# Patient Record
(demographics unavailable — no encounter records)

---

## 2024-11-26 NOTE — DISCUSSION/SUMMARY
[FreeTextEntry1] : 89 F HTN, GERD, pAF, SSS s/p PPM ([facility], 08/12), mod AS, here for follow up visit.  #H/o atypical chest pain Pharm stress test 2023 showed mild inferior, inferolateral reversible defect suggestive of ischemia -TTE 10/2024-no significant change from 2019/2023 -Off aspirin (stopped taking 6 mo ago given AC and advanced age) -C/w Metoprolol, will re-prescribe at 25 mg qd  -C/w atorvastatin 80 qd   #Subacute Fatigue, Dyspnea  As explained previously, this is less likely related to her moderate AS given no change from prior echo and no SOB or chest pain. Her device is likely set with managed ventricular pacing w/ AV hysteresis for intrinsic conduction allowing for a long AV delay given her baseline [place] prolongation. Suspect fatigue is likely deconditioning. No changes to symptoms recently. TTE 10/2024 with no interval changes - CTM   #pAfib -C/w apixaban 2.5 mg bid   #Mild-Moderate AS: as noted on prior TTE, also w/ mild-moderate AR. Stable since 2019, most recently 10/2024  -Repeat TTE as above  #HTN -C/w losartan 25 mg qd

## 2024-11-26 NOTE — HISTORY OF PRESENT ILLNESS
[FreeTextEntry1] : The patient is an [id number] with a history of hypertension, GERD, paroxysmal atrial fibrillation (pAF), sick sinus syndrome (SSS) status post dual-chamber pacemaker implantation in 2012 with generator change in 2021, and moderate aortic stenosis (AS), here for follow-up.   Pt accompanied by daughter. At her last visit in March 2024, she reported exertional fatigue and dyspnea without chest pain, palpitations, lightheadedness, or syncope. Device interrogation demonstrated normal function with adequate sensing and pacing thresholds, and the plan included maintaining medical therapy and repeating imaging to assess for any interval structural changes, as her fatigue was suspected to be multifactorial, including potential deconditioning.  She states her mother's primary issue has been gastritis symptoms and HA, requesting refill of Famotidine. Also has been off metoprolol for 'a while' but did not disclose this on previous visit. Additionally not taking statin. Denies acute cardiopulmonary complaints.   Recent transthoracic echocardiogram (TTE) from October 30, 2024, showed stable findings compared to prior imaging, including an ejection fraction of 61%, moderate (grade 2) diastolic dysfunction, severe left atrial dilation, mild aortic stenosis, moderate aortic regurgitation, moderate tricuspid regurgitation, and borderline pulmonary hypertension. Right ventricular function was normal, and there was no evidence of pericardial effusion or interval structural changes. Recent device interrogation remains consistent with normal function and stable thresholds.

## 2024-11-26 NOTE — PHYSICAL EXAM
[No Acute Distress] : no acute distress [Normal S1, S2] : normal S1, S2 [Clear Lung Fields] : clear lung fields [de-identified] : systolic murmur

## 2024-11-26 NOTE — CARDIOLOGY SUMMARY
[de-identified] : 11/26/24: sinus with AP 72 8/20/2024: sinus with AP 66 [de-identified] : Pharm NST 6/2023: small mild reversible defect in distal inferior and inferolateral wall c/w ischemia. stress LVEFG 63%, EDV 52, ESV 19  [de-identified] : TTE Summary (10/30/2024): LVEF 61%, GLS -18.6% (WNL). Mod (grade 2) diastolic dysfx w/  LV filling pressures. Severe LA dilation (KIERRA 50.17 mL/m). AV: mild AS (Vmax 2.48 m/s, MG 14 mmHg, SHIVANI 1.28 cm) + mod AR ( ms). TV: mod TR, PASP 39 mmHg (borderline pHTN). Normal RV fx (TAPSE 1.8 cm, S' 10 cm/s). No RWMAs or pericardial effusion. Findings stable vs. 9/2023. Normal LV mass/geometry.  TTE 9/2023: LVEF 66%, grade 2 DD, RV normal, moderate AS (peak caro 1.95 m/s, PG 15 mmHg, MG 8 mmHg, DVI 0.5, SHIVANI 1.28), mild to mod AR, no changes compared to 11/2019 [de-identified] : Device Interrogation Summary (9/30/2024): Pacemaker: Medtronic [id number] [place] XT DR ROMEO, implanted 3/12/2021, 42 months old. Battery voltage 3.01 V (RRT = 2.63 V) with ~9.5 yrs of longevity remaining. RA lead (Guidant 4469, implanted 8/14/2012): impedance 418 , pacing threshold 1.0 V @ 0.4 ms. RV lead (Medtronic 4074, implanted 8/14/2012): impedance 456 , pacing threshold 1.0 V @ 0.4 ms, R-wave amplitude 14.8 mV. Mode: AAIRDDD.  <0.1%. Histograms show HR variability WNL. Lead safety margins adjusted (RA/RV from 2X to 1.5X) to optimize battery life. AP: 100%. No recent arrhythmias/events recorded.  6/2024 Remote ICD: battery life 9.7 years. Presenting AP/VS. AT/AF w CVR noted 6/7 ~46 seconds for review.

## 2025-03-25 NOTE — END OF VISIT
[] : Resident [FreeTextEntry3] : as per above note, pt reports no mechanical falls/no concerns re balance. has osteporosis and on chronic AC for a fib cont to monitor her ADLs capabilities and maintaining safe envrionment

## 2025-03-25 NOTE — REVIEW OF SYSTEMS
[Shortness Of Breath] : shortness of breath [Muscle Pain] : muscle pain [Anxiety] : anxiety [Negative] : Neurological [FreeTextEntry6] : Goes away when she smells alcohol [FreeTextEntry7] : Only with spicy food [de-identified] : social anxiety

## 2025-03-25 NOTE — REVIEW OF SYSTEMS
[Shortness Of Breath] : shortness of breath [Muscle Pain] : muscle pain [Anxiety] : anxiety [Negative] : Neurological [FreeTextEntry6] : Goes away when she smells alcohol [FreeTextEntry7] : Only with spicy food [de-identified] : social anxiety

## 2025-03-25 NOTE — REVIEW OF SYSTEMS
[Shortness Of Breath] : shortness of breath [Muscle Pain] : muscle pain [Anxiety] : anxiety [Negative] : Neurological [FreeTextEntry6] : Goes away when she smells alcohol [de-identified] : social anxiety [FreeTextEntry7] : Only with spicy food

## 2025-03-25 NOTE — HISTORY OF PRESENT ILLNESS
[FreeTextEntry1] : CPE [de-identified] : 90 y/o F with PMH of HTN, p-Afib, osteoporosis, GERD, diffuse bone pain, h/o LFTs here for CPE. Patient has complaints of random muscle aches but other than that feels ok. She is AO X 2.5 (name/location/year and day and month intact but doesn't know date). She is able to complete all of her ADLs herself. She lives with her daughter. Discussed MOLST form with the family and patient wasn't sure. She doesn't want pain of procedures but also wants to live as long as possible.

## 2025-03-25 NOTE — HISTORY OF PRESENT ILLNESS
[FreeTextEntry1] : CPE [de-identified] : 88 y/o F with PMH of HTN, p-Afib, osteoporosis, GERD, diffuse bone pain, h/o LFTs here for CPE. Patient has complaints of random muscle aches but other than that feels ok. She is AO X 2.5 (name/location/year and day and month intact but doesn't know date). She is able to complete all of her ADLs herself. She lives with her daughter. Discussed MOLST form with the family and patient wasn't sure. She doesn't want pain of procedures but also wants to live as long as possible.

## 2025-03-25 NOTE — HEALTH RISK ASSESSMENT
[Good] : ~his/her~  mood as  good [No] : No [No falls in past year] : Patient reported no falls in the past year [0] : 2) Feeling down, depressed, or hopeless: Not at all (0) [Never] : Never [None] : None [With Family] : lives with family [Unemployed] : unemployed [Single] : single [Feels Safe at Home] : Feels safe at home [Fully functional (bathing, dressing, toileting, transferring, walking, feeding)] : Fully functional (bathing, dressing, toileting, transferring, walking, feeding) [Fully functional (using the telephone, shopping, preparing meals, housekeeping, doing laundry, using] : Fully functional and needs no help or supervision to perform IADLs (using the telephone, shopping, preparing meals, housekeeping, doing laundry, using transportation, managing medications and managing finances) [Smoke Detector] : smoke detector [Carbon Monoxide Detector] : carbon monoxide detector [Safety elements used in home] : safety elements used in home [Seat Belt] :  uses seat belt [With Patient/Caregiver] : , with patient/caregiver [Designated Healthcare Proxy] : Designated healthcare proxy [Name: ___] : Health Care Proxy's Name: [unfilled]  [Relationship: ___] : Relationship: [unfilled] [I will adhere to the patient's wishes.] : I will adhere to the patient's wishes. [de-identified] : None [Change in mental status noted] : No change in mental status noted [Language] : denies difficulty with language [Behavior] : denies difficulty with behavior [Learning/Retaining New Information] : denies difficulty learning/retaining new information [Handling Complex Tasks] : denies difficulty handling complex tasks [Reasoning] : denies difficulty with reasoning [Spatial Ability and Orientation] : denies difficulty with spatial ability and orientation [Reports changes in hearing] : Reports no changes in hearing [Reports changes in vision] : Reports no changes in vision [Reports normal functional visual acuity (ie: able to read med bottle)] : Reports poor functional visual acuity.  [Reports changes in dental health] : Reports no changes in dental health [AdvancecareDate] : 03/18/25 [FreeTextEntry4] : Discussed CPR/intubation with patient. Not sure about decision. Will take MOLST home to review with family.

## 2025-03-25 NOTE — PHYSICAL EXAM
[Normal] : soft, non-tender, non-distended, no masses palpated, no HSM and normal bowel sounds [de-identified] : Ear wax, was only able to examine part of her TM

## 2025-03-25 NOTE — PHYSICAL EXAM
[Normal] : soft, non-tender, non-distended, no masses palpated, no HSM and normal bowel sounds [de-identified] : Ear wax, was only able to examine part of her TM

## 2025-03-25 NOTE — HEALTH RISK ASSESSMENT
[Good] : ~his/her~  mood as  good [No] : No [No falls in past year] : Patient reported no falls in the past year [0] : 2) Feeling down, depressed, or hopeless: Not at all (0) [Never] : Never [None] : None [With Family] : lives with family [Unemployed] : unemployed [Single] : single [Feels Safe at Home] : Feels safe at home [Fully functional (bathing, dressing, toileting, transferring, walking, feeding)] : Fully functional (bathing, dressing, toileting, transferring, walking, feeding) [Fully functional (using the telephone, shopping, preparing meals, housekeeping, doing laundry, using] : Fully functional and needs no help or supervision to perform IADLs (using the telephone, shopping, preparing meals, housekeeping, doing laundry, using transportation, managing medications and managing finances) [Smoke Detector] : smoke detector [Carbon Monoxide Detector] : carbon monoxide detector [Safety elements used in home] : safety elements used in home [Seat Belt] :  uses seat belt [With Patient/Caregiver] : , with patient/caregiver [Designated Healthcare Proxy] : Designated healthcare proxy [Name: ___] : Health Care Proxy's Name: [unfilled]  [Relationship: ___] : Relationship: [unfilled] [I will adhere to the patient's wishes.] : I will adhere to the patient's wishes. [de-identified] : None [Change in mental status noted] : No change in mental status noted [Language] : denies difficulty with language [Behavior] : denies difficulty with behavior [Learning/Retaining New Information] : denies difficulty learning/retaining new information [Handling Complex Tasks] : denies difficulty handling complex tasks [Reasoning] : denies difficulty with reasoning [Spatial Ability and Orientation] : denies difficulty with spatial ability and orientation [Reports changes in hearing] : Reports no changes in hearing [Reports changes in vision] : Reports no changes in vision [Reports normal functional visual acuity (ie: able to read med bottle)] : Reports poor functional visual acuity.  [Reports changes in dental health] : Reports no changes in dental health [AdvancecareDate] : 03/18/25 [FreeTextEntry4] : Discussed CPR/intubation with patient. Not sure about decision. Will take MOLST home to review with family.

## 2025-03-25 NOTE — PHYSICAL EXAM
[Normal] : soft, non-tender, non-distended, no masses palpated, no HSM and normal bowel sounds [de-identified] : Ear wax, was only able to examine part of her TM

## 2025-03-25 NOTE — HISTORY OF PRESENT ILLNESS
[FreeTextEntry1] : CPE [de-identified] : 88 y/o F with PMH of HTN, p-Afib, osteoporosis, GERD, diffuse bone pain, h/o LFTs here for CPE. Patient has complaints of random muscle aches but other than that feels ok. She is AO X 2.5 (name/location/year and day and month intact but doesn't know date). She is able to complete all of her ADLs herself. She lives with her daughter. Discussed MOLST form with the family and patient wasn't sure. She doesn't want pain of procedures but also wants to live as long as possible.

## 2025-03-25 NOTE — PLAN
[FreeTextEntry1] : #SNHL #Impacted Cerumen -ENT referral  #Osteoporosis -Repeat dexa -Referral for endo follow-up   #HCM -f/u CMP/CBC

## 2025-03-25 NOTE — HEALTH RISK ASSESSMENT
[Good] : ~his/her~  mood as  good [No] : No [No falls in past year] : Patient reported no falls in the past year [0] : 2) Feeling down, depressed, or hopeless: Not at all (0) [Never] : Never [None] : None [With Family] : lives with family [Unemployed] : unemployed [Single] : single [Feels Safe at Home] : Feels safe at home [Fully functional (bathing, dressing, toileting, transferring, walking, feeding)] : Fully functional (bathing, dressing, toileting, transferring, walking, feeding) [Fully functional (using the telephone, shopping, preparing meals, housekeeping, doing laundry, using] : Fully functional and needs no help or supervision to perform IADLs (using the telephone, shopping, preparing meals, housekeeping, doing laundry, using transportation, managing medications and managing finances) [Smoke Detector] : smoke detector [Carbon Monoxide Detector] : carbon monoxide detector [Safety elements used in home] : safety elements used in home [Seat Belt] :  uses seat belt [With Patient/Caregiver] : , with patient/caregiver [Designated Healthcare Proxy] : Designated healthcare proxy [Name: ___] : Health Care Proxy's Name: [unfilled]  [Relationship: ___] : Relationship: [unfilled] [I will adhere to the patient's wishes.] : I will adhere to the patient's wishes. [de-identified] : None [Change in mental status noted] : No change in mental status noted [Language] : denies difficulty with language [Behavior] : denies difficulty with behavior [Learning/Retaining New Information] : denies difficulty learning/retaining new information [Handling Complex Tasks] : denies difficulty handling complex tasks [Reasoning] : denies difficulty with reasoning [Spatial Ability and Orientation] : denies difficulty with spatial ability and orientation [Reports changes in hearing] : Reports no changes in hearing [Reports changes in vision] : Reports no changes in vision [Reports normal functional visual acuity (ie: able to read med bottle)] : Reports poor functional visual acuity.  [Reports changes in dental health] : Reports no changes in dental health [AdvancecareDate] : 03/18/25 [FreeTextEntry4] : Discussed CPR/intubation with patient. Not sure about decision. Will take MOLST home to review with family.

## 2025-03-28 NOTE — HISTORY OF PRESENT ILLNESS
[FreeTextEntry1] : The patient is an [id number] with a history of hypertension, GERD, paroxysmal atrial fibrillation (pAF), sick sinus syndrome (SSS) status post dual-chamber pacemaker implantation in 2012 with generator change in 2021, and moderate aortic stenosis (AS), here for follow-up.   Pt accompanied by daughter. She states her mother's primary issue has been muskuloskeletal (R shoulder abduction and back pain). BP in office today is elevated, on re-check 150/70. She has been on only 12.5 mg of Losartan. Now adherent to metoprolol and statin since last visit. Denies any other acute cardiopulmonary complaints.   Recent device interrogation on 12/2024 remains consistent with normal function and stable thresholds.

## 2025-03-28 NOTE — PHYSICAL EXAM
[No Acute Distress] : no acute distress [Normal S1, S2] : normal S1, S2 [Clear Lung Fields] : clear lung fields [de-identified] : systolic murmur

## 2025-03-28 NOTE — HISTORY OF PRESENT ILLNESS
[FreeTextEntry1] : The patient is an [id number] with a history of hypertension, GERD, paroxysmal atrial fibrillation (pAF), sick sinus syndrome (SSS) status post dual-chamber pacemaker implantation in 2012 with generator change in 2021, and moderate aortic stenosis (AS), here for follow-up.   Pt accompanied by daughter. She states her mother's primary issue has been muskuloskeletal (R shoulder abduction and back pain). BP in office today is elevated, on re-check 150/70. She has been on only 12.5 mg of Losartan. Now adherent to metoprolol and statin since last visit. Denies any other acute cardiopulmonary complaints.   Recent device interrogation on 12/2024 remains consistent with normal function and stable thresholds.

## 2025-03-28 NOTE — CARDIOLOGY SUMMARY
[de-identified] : 11/26/24: sinus with AP 72 8/20/2024: sinus with AP 66 [de-identified] : Pharm NST 6/2023: small mild reversible defect in distal inferior and inferolateral wall c/w ischemia. stress LVEFG 63%, EDV 52, ESV 19  [de-identified] : TTE Summary (10/30/2024): LVEF 61%, GLS -18.6% (WNL). Mod (grade 2) diastolic dysfx w/  LV filling pressures. Severe LA dilation (KIERRA 50.17 mL/m). AV: mild AS (Vmax 2.48 m/s, MG 14 mmHg, SHIVANI 1.28 cm) + mod AR ( ms). TV: mod TR, PASP 39 mmHg (borderline pHTN). Normal RV fx (TAPSE 1.8 cm, S' 10 cm/s). No RWMAs or pericardial effusion. Findings stable vs. 9/2023. Normal LV mass/geometry.  TTE 9/2023: LVEF 66%, grade 2 DD, RV normal, moderate AS (peak caro 1.95 m/s, PG 15 mmHg, MG 8 mmHg, DVI 0.5, SHIVANI 1.28), mild to mod AR, no changes compared to 11/2019 [de-identified] : Device Interrogation Summary (9/30/2024): Pacemaker: Medtronic [id number] [place] XT DR ROMEO, implanted 3/12/2021, 42 months old. Battery voltage 3.01 V (RRT = 2.63 V) with ~9.5 yrs of longevity remaining. RA lead (Guidant 4469, implanted 8/14/2012): impedance 418 , pacing threshold 1.0 V @ 0.4 ms. RV lead (Medtronic 4074, implanted 8/14/2012): impedance 456 , pacing threshold 1.0 V @ 0.4 ms, R-wave amplitude 14.8 mV. Mode: AAIRDDD.  <0.1%. Histograms show HR variability WNL. Lead safety margins adjusted (RA/RV from 2X to 1.5X) to optimize battery life. AP: 100%. No recent arrhythmias/events recorded.  6/2024 Remote ICD: battery life 9.7 years. Presenting AP/VS. AT/AF w CVR noted 6/7 ~46 seconds for review.

## 2025-03-28 NOTE — CARDIOLOGY SUMMARY
[de-identified] : 11/26/24: sinus with AP 72 8/20/2024: sinus with AP 66 [de-identified] : Pharm NST 6/2023: small mild reversible defect in distal inferior and inferolateral wall c/w ischemia. stress LVEFG 63%, EDV 52, ESV 19  [de-identified] : TTE Summary (10/30/2024): LVEF 61%, GLS -18.6% (WNL). Mod (grade 2) diastolic dysfx w/  LV filling pressures. Severe LA dilation (KIERRA 50.17 mL/m). AV: mild AS (Vmax 2.48 m/s, MG 14 mmHg, SHIVANI 1.28 cm) + mod AR ( ms). TV: mod TR, PASP 39 mmHg (borderline pHTN). Normal RV fx (TAPSE 1.8 cm, S' 10 cm/s). No RWMAs or pericardial effusion. Findings stable vs. 9/2023. Normal LV mass/geometry.  TTE 9/2023: LVEF 66%, grade 2 DD, RV normal, moderate AS (peak caro 1.95 m/s, PG 15 mmHg, MG 8 mmHg, DVI 0.5, SHIVANI 1.28), mild to mod AR, no changes compared to 11/2019 [de-identified] : Device Interrogation Summary (9/30/2024): Pacemaker: Medtronic [id number] [place] XT DR ROMEO, implanted 3/12/2021, 42 months old. Battery voltage 3.01 V (RRT = 2.63 V) with ~9.5 yrs of longevity remaining. RA lead (Guidant 4469, implanted 8/14/2012): impedance 418 , pacing threshold 1.0 V @ 0.4 ms. RV lead (Medtronic 4074, implanted 8/14/2012): impedance 456 , pacing threshold 1.0 V @ 0.4 ms, R-wave amplitude 14.8 mV. Mode: AAIRDDD.  <0.1%. Histograms show HR variability WNL. Lead safety margins adjusted (RA/RV from 2X to 1.5X) to optimize battery life. AP: 100%. No recent arrhythmias/events recorded.  6/2024 Remote ICD: battery life 9.7 years. Presenting AP/VS. AT/AF w CVR noted 6/7 ~46 seconds for review.

## 2025-03-28 NOTE — HISTORY OF PRESENT ILLNESS
[FreeTextEntry1] : The patient is an [id number] with a history of hypertension, GERD, paroxysmal atrial fibrillation (pAF), sick sinus syndrome (SSS) status post dual-chamber pacemaker implantation in 2012 with generator change in 2021, and moderate aortic stenosis (AS), here for follow-up.   Pt accompanied by daughter. She states her mother's primary issue has been muskuloskeletal (R shoulder abduction and back pain). BP in office today is elevated, on re-check 150/70. She has been on only 12.5 mg of Losartan. Now adherent to metoprolol and statin since last visit. Denies any other acute cardiopulmonary complaints.   Recent device interrogation on 12/2024 remains consistent with normal function and stable thresholds.

## 2025-03-28 NOTE — DISCUSSION/SUMMARY
[FreeTextEntry1] : 89 F HTN, GERD, pAF, SSS s/p PPM ([facility], 08/12), mod AS, here for follow up visit.  #H/o atypical chest pain Pharm stress test 2023 showed mild inferior, inferolateral reversible defect suggestive of ischemia -TTE 10/2024-no significant change from 2019/2023 -Off aspirin (stopped taking 6 mo ago given AC and advanced age) -C/w Metoprolol 25 mg qd, will increase Losartan 12.5 to 25 mg qd given elevated BP -Counseled on BP cuff at home, pt daughter will purchase to record home measurements -C/w atorvastatin 80 qd   #Subacute Fatigue, Dyspnea  As explained previously, this is less likely related to her moderate AS given no change from prior echo and no SOB or chest pain. Her device is likely set with managed ventricular pacing w/ AV hysteresis for intrinsic conduction allowing for a long AV delay given her baseline [place] prolongation. Suspect fatigue is likely deconditioning. No changes to symptoms recently. TTE 10/2024 with no interval changes - CTM   #pAfib -C/w apixaban 2.5 mg bid   #Mild-Moderate AS: as noted on prior TTE, also w/ mild-moderate AR. Stable since 2019, most recently 10/2024  -Repeat TTE as above  #HTN -C/w losartan 25 mg qd as vladislav  RTC 6 mo

## 2025-03-28 NOTE — DISCUSSION/SUMMARY
[FreeTextEntry1] : 89 F HTN, GERD, pAF, SSS s/p PPM ([facility], 08/12), mod AS, here for follow up visit.  #H/o atypical chest pain Pharm stress test 2023 showed mild inferior, inferolateral reversible defect suggestive of ischemia -TTE 10/2024-no significant change from 2019/2023 -Off aspirin (stopped taking 6 mo ago given AC and advanced age) -C/w Metoprolol 25 mg qd, will increase Losartan 12.5 to 25 mg qd given elevated BP -Counseled on BP cuff at home, pt daughter will purchase to record home measurements -C/w atorvastatin 80 qd   #Subacute Fatigue, Dyspnea  As explained previously, this is less likely related to her moderate AS given no change from prior echo and no SOB or chest pain. Her device is likely set with managed ventricular pacing w/ AV hysteresis for intrinsic conduction allowing for a long AV delay given her baseline [place] prolongation. Suspect fatigue is likely deconditioning. No changes to symptoms recently. TTE 10/2024 with no interval changes - CTM   #pAfib -C/w apixaban 2.5 mg bid   #Mild-Moderate AS: as noted on prior TTE, also w/ mild-moderate AR. Stable since 2019, most recently 10/2024  -Repeat TTE as above  #HTN -C/w losartan 25 mg qd as vladislav  RTC 6 mo

## 2025-03-28 NOTE — PHYSICAL EXAM
[No Acute Distress] : no acute distress [Normal S1, S2] : normal S1, S2 [Clear Lung Fields] : clear lung fields [de-identified] : systolic murmur

## 2025-03-28 NOTE — PHYSICAL EXAM
[No Acute Distress] : no acute distress [Normal S1, S2] : normal S1, S2 [Clear Lung Fields] : clear lung fields [de-identified] : systolic murmur

## 2025-03-28 NOTE — CARDIOLOGY SUMMARY
[de-identified] : 11/26/24: sinus with AP 72 8/20/2024: sinus with AP 66 [de-identified] : Pharm NST 6/2023: small mild reversible defect in distal inferior and inferolateral wall c/w ischemia. stress LVEFG 63%, EDV 52, ESV 19  [de-identified] : TTE Summary (10/30/2024): LVEF 61%, GLS -18.6% (WNL). Mod (grade 2) diastolic dysfx w/  LV filling pressures. Severe LA dilation (KIERRA 50.17 mL/m). AV: mild AS (Vmax 2.48 m/s, MG 14 mmHg, SHIVANI 1.28 cm) + mod AR ( ms). TV: mod TR, PASP 39 mmHg (borderline pHTN). Normal RV fx (TAPSE 1.8 cm, S' 10 cm/s). No RWMAs or pericardial effusion. Findings stable vs. 9/2023. Normal LV mass/geometry.  TTE 9/2023: LVEF 66%, grade 2 DD, RV normal, moderate AS (peak caro 1.95 m/s, PG 15 mmHg, MG 8 mmHg, DVI 0.5, SHIVANI 1.28), mild to mod AR, no changes compared to 11/2019 [de-identified] : Device Interrogation Summary (9/30/2024): Pacemaker: Medtronic [id number] [place] XT DR ROMEO, implanted 3/12/2021, 42 months old. Battery voltage 3.01 V (RRT = 2.63 V) with ~9.5 yrs of longevity remaining. RA lead (Guidant 4469, implanted 8/14/2012): impedance 418 , pacing threshold 1.0 V @ 0.4 ms. RV lead (Medtronic 4074, implanted 8/14/2012): impedance 456 , pacing threshold 1.0 V @ 0.4 ms, R-wave amplitude 14.8 mV. Mode: AAIRDDD.  <0.1%. Histograms show HR variability WNL. Lead safety margins adjusted (RA/RV from 2X to 1.5X) to optimize battery life. AP: 100%. No recent arrhythmias/events recorded.  6/2024 Remote ICD: battery life 9.7 years. Presenting AP/VS. AT/AF w CVR noted 6/7 ~46 seconds for review.

## 2025-03-28 NOTE — DISCUSSION/SUMMARY
[FreeTextEntry1] : 89 F HTN, GERD, pAF, SSS s/p PPM ([facility], 08/12), mod AS, here for follow up visit.  #H/o atypical chest pain Pharm stress test 2023 showed mild inferior, inferolateral reversible defect suggestive of ischemia -TTE 10/2024-no significant change from 2019/2023 -Off aspirin (stopped taking 6 mo ago given AC and advanced age) -C/w Metoprolol 25 mg qd, will increase Losartan 12.5 to 25 mg qd given elevated BP -Counseled on BP cuff at home, pt daughter will purchase to record home measurements -C/w atorvastatin 80 qd   #Subacute Fatigue, Dyspnea  As explained previously, this is less likely related to her moderate AS given no change from prior echo and no SOB or chest pain. Her device is likely set with managed ventricular pacing w/ AV hysteresis for intrinsic conduction allowing for a long AV delay given her baseline [place] prolongation. Suspect fatigue is likely deconditioning. No changes to symptoms recently. TTE 10/2024 with no interval changes - CTM   #pAfib -C/w apixaban 2.5 mg bid   #Mild-Moderate AS: as noted on prior TTE, also w/ mild-moderate AR. Stable since 2019, most recently 10/2024  -Repeat TTE as above  #HTN -C/w losartan 25 mg qd as vladislav  RTC 6 mo

## 2025-05-30 NOTE — PLAN
Medication was sent to [facility] pharmacy . Please review . Needs to go to [facility]   
Patient called requesting refill for Valsartan-Hydrochlorothiazide. Patient made aware medication was refilled on 1/26/24 for 90 with 3 refills to [facility] pharmacy. Patient instructed to contact the pharmacy to obtain refills of medication. Patient verbalized understanding.     
[FreeTextEntry1] : #CKD At CPE patient continued to have GFR of 46 with increase in Cr to 1.15. Give her small stature/increase in Cr had her come back today for: UA/BMP/Urine Studies/renal u/s -f/u results of tests listed above  #Leg cramp long standing > 10 years; no sensation loss in L2 dermatome where cramp is located -f/u lumbar spine x-ray -f/u ANIYA  #HCM Molst discussed again with daughter; went over MOL and explained each option. She will fill out and bring to next appointment. Please review with her again to ensure everything reflects her mom's wishes!  -RTC 5 weeks to discuss results

## 2025-05-30 NOTE — PHYSICAL EXAM
[Normal] : no CVA or spinal tenderness [de-identified] : No loss of sensation in L2 dermatome per microfilament exam

## 2025-05-30 NOTE — HISTORY OF PRESENT ILLNESS
[FreeTextEntry1] : f/u for CKD work-up [de-identified] : 89 y/o F with PMH of HTN, p-Afib, osteoporosis, GERD, diffuse bone pain, h/o elevated LFTs here for f/u of abnormal GFR and increased Creatinine found at CPE last month.   At this visit patient is complaining of cramping in her inner thigh that is present when she's walking fast. It is helped with Ron Stewart. Patient doesn't want to use acetaminophen. Pain is so bad that she can feel SOB/CP. Leg cramp has been present for more than 10 years. When she sits, the pain goes away. No nausea/vomiting/diarrhea/dysuria.   Of note: She is AO X 2.5 (name/location/year and day and month intact but doesn't know date). She is able to complete all of her ADLs herself. She lives with her daughter.

## 2025-07-01 NOTE — PHYSICAL EXAM
[No Acute Distress] : no acute distress [Well-Appearing] : well-appearing [Normal Voice/Communication] : normal voice/communication [No Respiratory Distress] : no respiratory distress  [No Accessory Muscle Use] : no accessory muscle use [Clear to Auscultation] : lungs were clear to auscultation bilaterally [Normal Rate] : normal rate  [Regular Rhythm] : with a regular rhythm [de-identified] : vesicular fluid filled lesions in a dermatomal distribution starting from right axilla extending to forearm and palm. No lesion seen on neck.

## 2025-07-01 NOTE — ASSESSMENT
[FreeTextEntry1] : 91 y/o F with PMH of HTN, p-Afib, osteoporosis, GERD, diffuse bone pain, h/o elevated LFTs came for blisters rash.  Herpes Zoster - Dermatomal, painful, vesicular rash is consistent with zoster.  Likely T1 - GFR 45, BUN 29. Start renal dose valacyclovir 1g BID x 10days. - Educated rash care and prevention of spread. Monitor for signs of bacterial infection. - Go to ER if no improvement after taking valacyclovir. May need IV antiviral.   HTN - /88, 142/76 - c/w losartan 25mg  Afib - c/w Metoprolol and Eliquis   HLD - LDL 87 (05/2025). c/w atorvastatin 40mg

## 2025-07-01 NOTE — REVIEW OF SYSTEMS
[Fever] : no fever [Chills] : no chills [Fatigue] : no fatigue [Discharge] : no discharge [Pain] : no pain [Redness] : no redness [Vision Problems] : no vision problems [Itching] : no itching [Chest Pain] : no chest pain [Palpitations] : no palpitations [Lower Ext Edema] : no lower extremity edema [Shortness Of Breath] : no shortness of breath [Wheezing] : no wheezing [Cough] : no cough [de-identified] : see HPI, painful blisters

## 2025-07-01 NOTE — HISTORY OF PRESENT ILLNESS
[FreeTextEntry8] : 89 y/o F with PMH of HTN, p-Afib, osteoporosis, GERD, diffuse bone pain, h/o elevated LFTs came for blisters rash.    The painful rash began in the right axillary area as fluid-filled blisters and progressively spread to the right forearm and palm. She has rash for 10days. She now also reports neck itchiness. Her daughter checked and did not see rash on the neck. Daughter reports she never had shingle before and never had Shingrix vaccine. No fever, chills, or change in mental status.

## 2025-07-21 NOTE — PHYSICAL EXAM
[No Acute Distress] : no acute distress [No Respiratory Distress] : no respiratory distress  [Clear to Auscultation] : lungs were clear to auscultation bilaterally [Normal Rate] : normal rate  [Regular Rhythm] : with a regular rhythm [Soft] : abdomen soft [Non Tender] : non-tender [Normal Affect] : the affect was normal [Alert and Oriented x3] : oriented to person, place, and time

## 2025-07-22 NOTE — REVIEW OF SYSTEMS
[Fever] : no fever [Chills] : no chills [Chest Pain] : no chest pain [Palpitations] : no palpitations [Shortness Of Breath] : no shortness of breath [Cough] : no cough [Abdominal Pain] : no abdominal pain [Nausea] : no nausea [Constipation] : no constipation [Dysuria] : no dysuria [de-identified] : (+) tingling RUE

## 2025-07-22 NOTE — REVIEW OF SYSTEMS
[Fever] : no fever [Chills] : no chills [Chest Pain] : no chest pain [Palpitations] : no palpitations [Shortness Of Breath] : no shortness of breath [Cough] : no cough [Abdominal Pain] : no abdominal pain [Nausea] : no nausea [Constipation] : no constipation [Dysuria] : no dysuria [de-identified] : (+) tingling RUE

## 2025-07-22 NOTE — HISTORY OF PRESENT ILLNESS
[FreeTextEntry1] : RPA [de-identified] : 89 y/o F with PMH of HTN, p-Afib, osteoporosis, GERD, diffuse bone pain, h/o elevated LFTs presenting for follow up  #Positive U/A - Patient with previous u/a with small blood  - Renal U/S 07/2025 wnl - Pt and daughter in agreement if repeat u/a was to show blood, they would not want further intervention  #Osteoporosis - Patient previously on infusions therapy, unsure if on bisphosphonate therapy in the past, unsure why they stopped infusion therapy - repeat DEXA scan positive for osteoporosis, has appt with endocrine in August.  #Shingles - s/p treatment with valacyclovir, now still having some tingling on RUE - not too bothersome

## 2025-07-22 NOTE — HISTORY OF PRESENT ILLNESS
[FreeTextEntry1] : RPA [de-identified] : 91 y/o F with PMH of HTN, p-Afib, osteoporosis, GERD, diffuse bone pain, h/o elevated LFTs presenting for follow up  #Positive U/A - Patient with previous u/a with small blood  - Renal U/S 07/2025 wnl - Pt and daughter in agreement if repeat u/a was to show blood, they would not want further intervention  #Osteoporosis - Patient previously on infusions therapy, unsure if on bisphosphonate therapy in the past, unsure why they stopped infusion therapy - repeat DEXA scan positive for osteoporosis, has appt with endocrine in August.  #Shingles - s/p treatment with valacyclovir, now still having some tingling on RUE - not too bothersome